# Patient Record
Sex: MALE | NOT HISPANIC OR LATINO | Employment: OTHER | URBAN - METROPOLITAN AREA
[De-identification: names, ages, dates, MRNs, and addresses within clinical notes are randomized per-mention and may not be internally consistent; named-entity substitution may affect disease eponyms.]

---

## 2018-04-19 ENCOUNTER — TELEPHONE (OUTPATIENT)
Dept: FAMILY MEDICINE | Facility: CLINIC | Age: 58
End: 2018-04-19

## 2018-04-19 NOTE — TELEPHONE ENCOUNTER
Daughter Nolan - no consent on file calling asking questions regarding being seen for exposure to TB - patient recently visited aunt who is currently inpatient for active TB - patient has no symptoms at this time -    Per review MDKENZIE and Dr. Anton HANKS for office visit with provider    Scheduled 4/23/2018 - discussed symptoms of TB and nurse triage and UC hours if things change - daughter agrees with plan    Closing encounter - no further actions needed at this time    Mendel Silva RN

## 2018-04-23 ENCOUNTER — OFFICE VISIT (OUTPATIENT)
Dept: FAMILY MEDICINE | Facility: CLINIC | Age: 58
End: 2018-04-23
Payer: COMMERCIAL

## 2018-04-23 VITALS
HEART RATE: 73 BPM | WEIGHT: 151 LBS | TEMPERATURE: 97 F | HEIGHT: 63 IN | RESPIRATION RATE: 18 BRPM | SYSTOLIC BLOOD PRESSURE: 138 MMHG | OXYGEN SATURATION: 99 % | DIASTOLIC BLOOD PRESSURE: 88 MMHG | BODY MASS INDEX: 26.75 KG/M2

## 2018-04-23 DIAGNOSIS — Z20.1 EXPOSURE TO TB: Primary | ICD-10-CM

## 2018-04-23 PROCEDURE — 86480 TB TEST CELL IMMUN MEASURE: CPT | Performed by: FAMILY MEDICINE

## 2018-04-23 PROCEDURE — 36415 COLL VENOUS BLD VENIPUNCTURE: CPT | Performed by: FAMILY MEDICINE

## 2018-04-23 PROCEDURE — 99203 OFFICE O/P NEW LOW 30 MIN: CPT | Performed by: FAMILY MEDICINE

## 2018-04-23 NOTE — PATIENT INSTRUCTIONS
Follow-up with specialist if test result is positive given report of sister's TB being active and drug resistant.    Follow-up for preventive care visit as planned next week. Can review lab results at that time.

## 2018-04-23 NOTE — LETTER
April 26, 2018      Alireza Walton  [address] [id number]        Dear ,    We are writing to inform you of your test results.    I am writing to share your lab results from your recent visit at our office. This indicates that you do not have latent tuberculosis. Please find the results below.  If you have any questions regarding these test results let me know.    Resulted Orders   M Tuberculosis by Quantiferon   Result Value Ref Range    M Tuberculosis Result Negative NEG^Negative    M Tuberculosis Antigen Value 0.00 IU/mL      Comment:      This is a qualitative test.  The TB antigen IU/mL value is required for   documentation on certain government reporting forms but this value should not   be used to monitor disease progression or response to therapy.  Diagnosing or excluding tuberculosis disease, and assessing the probability of   LTBI, require a combination of epidemiological, historical, medical and   diagnostic findings that should be taken into account when interpreting   QuantiFERON TB results.         If you have any questions or concerns, please call the clinic at the number listed above.       Sincerely,        Jan Victoria MD/nr

## 2018-04-23 NOTE — PROGRESS NOTES
"  SUBJECTIVE:   Alireza Walton is a 57 year old male who presents to clinic today for the following health issues:    Sister has a drug resistant TB. It was asked that all family members get tested that have been in contact. He has been with her a month ago so would like to be tested. Has not shown any Sx.     Sister lives here in [place]. He doesn't live with her. He stops and visits to see her.    She was diagnosed with tuberculosis about a month ago. She goes to [facility] - now at [place].    Doesn't have a regular doctor. He used to go to Dr Lurdes Walton.    ROS  No fever  No cough  No skin rash    PMHx  No chronic medical conditions    EXAM:  /88  Pulse 73  Temp 97  F (36.1  C) (Oral)  Resp 18  Ht 5' 3\" (1.6 m)  Wt 151 lb (68.5 kg)  SpO2 99%  BMI 26.75 kg/m2  Constitutional: Healthy, alert, no distress   EENT: PERRL, TM's clear bilaterally, oropharynx without erythema, no anterior cervical lymphadenopathy   Cardiovascular: RRR. No murmurs   Respiratory: Clear to auscultation     ASSESSMENT    ICD-10-CM    1. Exposure to TB Z20.1 M Tuberculosis by Quantiferon      Plan:  Patient Instructions   Follow-up with specialist if test result is positive given report of sister's TB being active and drug resistant.    Follow-up for preventive care visit as planned next week. Can review lab results at that time.     Jan Victoria MD  Family Medicine Physician     "

## 2018-04-23 NOTE — MR AVS SNAPSHOT
After Visit Summary   4/23/2018    Alireza Walton    MRN: [id number]           Patient Information     Date Of Birth          1960        Visit Information        Provider Department      4/23/2018 1:15 PM Quentin Victoria, Jan Love MD; [employer] [facility]        Today's Diagnoses     Exposure to TB    -  1      Care Instructions    Follow-up with specialist if test result is positive given report of sister's TB being active and drug resistant.    Follow-up for preventive care visit as planned next week. Can review lab results at that time.          Follow-ups after your visit        Your next 10 appointments already scheduled     May 01, 2018  2:20 PM CDT   Office Visit with Osbaldo Walton MD   [facility]    [address] [place] [id number] [phone]           Bring a current list of meds and any records pertaining to this visit. For Physicals, please bring immunization records and any forms needing to be filled out. Please arrive 10 minutes early to complete paperwork.              Who to contact     If you have questions or need follow up information about today's clinic visit or your schedule please contact [facility] directly at [phone].  Normal or non-critical lab and imaging results will be communicated to you by MyChart, letter or phone within 4 business days after the clinic has received the results. If you do not hear from us within 7 days, please contact the clinic through [employer] or phone. If you have a critical or abnormal lab result, we will notify you by phone as soon as possible.  Submit refill requests through [employer] or call your pharmacy and they will forward the refill request to us. Please allow 3 business days for your refill to be completed.          Additional Information About Your Visit        [employer] Information     [employer] lets you send  "messages to your doctor, view your test results, renew your prescriptions, schedule appointments and more. To sign up, go to [email or web address] . Click on \"Log in\" on the left side of the screen, which will take you to the Welcome page. Then click on \"Sign up Now\" on the right side of the page.     You will be asked to enter the access code listed below, as well as some personal information. Please follow the directions to create your username and password.     Your access code is: [email or web address]  Expires: 2018  2:00 PM     Your access code will  in 90 days. If you need help or a new code, please call your [place] clinic or [phone].        Care EveryWhere ID     This is your Care EveryWhere ID. This could be used by other organizations to access your [place] medical records  [id number]        Your Vitals Were     Pulse Temperature Respirations Height Pulse Oximetry BMI (Body Mass Index)    73 97  F (36.1  C) (Oral) 18 5' 3\" (1.6 m) 99% 26.75 kg/m2       Blood Pressure from Last 3 Encounters:   18 138/88    Weight from Last 3 Encounters:   18 151 lb (68.5 kg)              We Performed the Following     M Tuberculosis by Quantiferon        Primary Care Provider Office Phone # Fax #    [facility] [place] [phone] [address] [facility] [address]        Equal Access to Services     REBECCA TIDWELL : Hadii aad ku hadasho Soomaali, waaxda luqadaha, qaybta kaalmada adeegyada, bill chavis . So [facility] [phone].    ATENCIÓN: Si habla letyañol, tiene a matthew disposición servicios gratuitos de asistencia lingüística. Debra al [phone].    We comply with applicable federal civil rights laws and [place] laws. We do not discriminate on the basis of race, color, national origin, age, disability, sex, sexual orientation, or gender identity.            Thank you!     Thank you for choosing [facility]  for your " care. Our goal is always to provide you with excellent care. Hearing back from our patients is one way we can continue to improve our services. Please take a few minutes to complete the written survey that you may receive in the mail after your visit with us. Thank you!             Your Updated Medication List - Protect others around you: Learn how to safely use, store and throw away your medicines at www.disposemymeds.org.      Notice  As of 4/23/2018  2:03 PM    You have not been prescribed any medications.

## 2018-04-25 LAB
M TB TUBERC IFN-G BLD QL: NEGATIVE
M TB TUBERC IFN-G/MITOGEN IGNF BLD: 0 IU/ML

## 2021-05-25 ENCOUNTER — RECORDS - HEALTHEAST (OUTPATIENT)
Dept: ADMINISTRATIVE | Facility: CLINIC | Age: 61
End: 2021-05-25

## 2024-01-20 ENCOUNTER — ANCILLARY PROCEDURE (OUTPATIENT)
Dept: GENERAL RADIOLOGY | Facility: CLINIC | Age: 64
End: 2024-01-20
Attending: PHYSICIAN ASSISTANT
Payer: COMMERCIAL

## 2024-01-20 ENCOUNTER — OFFICE VISIT (OUTPATIENT)
Dept: FAMILY MEDICINE | Facility: CLINIC | Age: 64
End: 2024-01-20
Payer: COMMERCIAL

## 2024-01-20 VITALS
BODY MASS INDEX: 26.22 KG/M2 | HEART RATE: 77 BPM | SYSTOLIC BLOOD PRESSURE: 136 MMHG | WEIGHT: 148 LBS | OXYGEN SATURATION: 98 % | DIASTOLIC BLOOD PRESSURE: 89 MMHG | TEMPERATURE: 97.8 F | RESPIRATION RATE: 18 BRPM

## 2024-01-20 DIAGNOSIS — S82.832A OTHER CLOSED FRACTURE OF PROXIMAL END OF LEFT FIBULA, INITIAL ENCOUNTER: Primary | ICD-10-CM

## 2024-01-20 DIAGNOSIS — R07.81 RIB PAIN: ICD-10-CM

## 2024-01-20 PROCEDURE — 99203 OFFICE O/P NEW LOW 30 MIN: CPT | Performed by: PHYSICIAN ASSISTANT

## 2024-01-20 PROCEDURE — 71110 X-RAY EXAM RIBS BIL 3 VIEWS: CPT | Mod: TC | Performed by: RADIOLOGY

## 2024-01-20 PROCEDURE — 73590 X-RAY EXAM OF LOWER LEG: CPT | Mod: TC | Performed by: RADIOLOGY

## 2024-01-20 NOTE — PATIENT INSTRUCTIONS
Your x-ray was positive for fracture(s) of the proximal fibula fracture     For management of pain and swelling:  - Keep splint on at all times to prevent movement at the site of injury  - Apply ice to area of swelling for 20 minutes at a time  - Elevate the injury above your heart to reduce swelling  - May use up to 600mg of Ibuprofen every 6-8 hours as needed for pain and swelling  - If prescribed additional pain medication, use as prescribed    Follow up with [facility] within the next 72 hours. There they will continue ongoing management of your fracture.     Reasons to be seen immediately in the emergency room:  - Worsening numbness  - Develop skin coldness or blue-colored skin  - Severe pain
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
## 2024-02-15 ENCOUNTER — OFFICE VISIT (OUTPATIENT)
Dept: FAMILY MEDICINE | Facility: CLINIC | Age: 64
End: 2024-02-15
Payer: COMMERCIAL

## 2024-02-15 VITALS
OXYGEN SATURATION: 97 % | TEMPERATURE: 98 F | HEART RATE: 76 BPM | WEIGHT: 143 LBS | BODY MASS INDEX: 25.33 KG/M2 | SYSTOLIC BLOOD PRESSURE: 131 MMHG | DIASTOLIC BLOOD PRESSURE: 85 MMHG | RESPIRATION RATE: 16 BRPM

## 2024-02-15 DIAGNOSIS — B02.9 HERPES ZOSTER WITHOUT COMPLICATION: Primary | ICD-10-CM

## 2024-02-15 PROCEDURE — 99213 OFFICE O/P EST LOW 20 MIN: CPT

## 2024-02-15 RX ORDER — LISINOPRIL 20 MG/1
20 TABLET ORAL DAILY
COMMUNITY
Start: 2023-11-10

## 2024-02-15 RX ORDER — ATORVASTATIN CALCIUM 20 MG/1
TABLET, FILM COATED ORAL
COMMUNITY
Start: 2023-06-07

## 2024-02-15 RX ORDER — VALACYCLOVIR HYDROCHLORIDE 1 G/1
1000 TABLET, FILM COATED ORAL 3 TIMES DAILY
Qty: 21 TABLET | Refills: 0 | Status: SHIPPED | OUTPATIENT
Start: 2024-02-15 | End: 2024-02-22

## 2024-02-15 RX ORDER — LIRAGLUTIDE 6 MG/ML
INJECTION SUBCUTANEOUS
COMMUNITY
Start: 2023-12-28

## 2024-02-15 RX ORDER — GLIPIZIDE 10 MG/1
TABLET, FILM COATED, EXTENDED RELEASE ORAL
COMMUNITY
Start: 2023-11-10

## 2024-02-15 RX ORDER — EMPAGLIFLOZIN 25 MG/1
25 TABLET, FILM COATED ORAL DAILY
COMMUNITY
Start: 2023-12-28

## 2024-02-15 NOTE — PATIENT INSTRUCTIONS
Take the medication as prescribed and finish the full course even if symptoms improve.  Use Calamine lotion for the pain and itching.  You can also use cool compresses to the area for pain and itching.  Can use Tylenol as needed for pain.  Maximum dose of Tylenol is 3000mg in a 24 hour period of time.

## 2024-02-15 NOTE — PROGRESS NOTES
ASSESSMENT:  (B02.9) Herpes zoster without complication  (primary encounter diagnosis)  Plan: valACYclovir (VALTREX) 1000 mg tablet    PLAN:  Shingles patient instructions in Hmong discussed and provided.  Informed the patient to take the medication as prescribed and finish the full course even if symptoms improve.  We discussed using calamine lotion for the pain and itching.  We also discussed using cool compresses to the area for pain and itching.  Informed the patient that he can use Tylenol as needed for pain with the maximum dose being 3000 mg in a 24-hour period of time.  Discussed the need to return to clinic with any new or worsening symptoms.  Patient acknowledged his understanding of the above plan.    The use of Dragon/[employer]ation services may have been used to construct the content in this note; any grammatical or spelling errors are non-intentional. Please contact the author of this note directly if you are in need of any clarification.      Yuval Rodney, APRN CNP      SUBJECTIVE:  Alireza Walton is a 63 year old male who presents to the clinic today for a rash.  Onset of rash was 1 week ago.  Location of the rash: left side of abdomen wrapping around into the back.  Symptoms are moderate and rash seems to be not changing over the course of time.  Associated symptoms include: itching and pain.  Therapies tried to improve the rash: none.  Recent new medication? No  Previous history of a similar rash? No  Recent exposure history: none known      ROS:  Negative except noted above.    OBJECTIVE:  EXAM:  VITALS: /85   Pulse 76   Temp 98  F (36.7  C)   Resp 16   Wt 64.9 kg (143 lb)   SpO2 97%   BMI 25.33 kg/m    GENERAL APPEARANCE: healthy, alert and no distress  EYES: EOMI,  PERRL, conjunctiva clear  SKIN: Rash description:    Location: Left-sided abdomen and flank  Distribution: localized  Lesion grouping: dermatomal following T10 dermatome  Lesion type: vesicular  Color:  red  Nail exam: normal- no clubbing or nail changes  Hair exam: normal